# Patient Record
Sex: MALE | Race: WHITE
[De-identification: names, ages, dates, MRNs, and addresses within clinical notes are randomized per-mention and may not be internally consistent; named-entity substitution may affect disease eponyms.]

---

## 2020-03-31 PROBLEM — Z00.00 ENCOUNTER FOR PREVENTIVE HEALTH EXAMINATION: Status: ACTIVE | Noted: 2020-03-31

## 2020-04-09 ENCOUNTER — APPOINTMENT (OUTPATIENT)
Dept: ORTHOPEDIC SURGERY | Facility: CLINIC | Age: 45
End: 2020-04-09

## 2020-07-20 ENCOUNTER — APPOINTMENT (OUTPATIENT)
Dept: ORTHOPEDIC SURGERY | Facility: CLINIC | Age: 45
End: 2020-07-20
Payer: COMMERCIAL

## 2020-07-20 VITALS — BODY MASS INDEX: 28.88 KG/M2 | WEIGHT: 195 LBS | HEIGHT: 69 IN

## 2020-07-20 VITALS — TEMPERATURE: 98.6 F

## 2020-07-20 PROCEDURE — 20610 DRAIN/INJ JOINT/BURSA W/O US: CPT | Mod: LT

## 2020-07-20 PROCEDURE — 99203 OFFICE O/P NEW LOW 30 MIN: CPT | Mod: 25

## 2020-07-20 PROCEDURE — 73030 X-RAY EXAM OF SHOULDER: CPT | Mod: LT

## 2020-07-21 NOTE — PHYSICAL EXAM
[de-identified] : Oriented to time, place, person\par Mood: Normal\par Affect: Normal\par Appearance: Healthy, well appearing, no acute distress.\par Gait: Normal\par Assistive Devices: None\par \par Left shoulder exam\par \par Inspection: No malalignment, No defects, No atrophy\par Skin: No masses, No lesions\par Neck: Negative Spurlings, full ROM, no pain with ROM\par AROM: FF to 170, abduction to 85, ER to 30, IR to mid lumbar\par PROM: Same\par Painful arc ROM: Pain with further passive motion\par Tenderness: Positive bicipital tenderness, no tenderness to the greater tuberosity/RTC insertion, positive anterior shoulder/lesser tuberosity tenderness\par Strength: 5/5 ER, 5/5 IR in adduction, 5/5 supraspinatus testing, mild O'Briens test\par AC Joint: No ttp/pain with cross arm testing\par Biceps: Speed Negative, Yergusons Negative\par Impingement test: Positive Blackwell, Positive Neer \par Stability: Stable\par Vasc: 2+ radial pulse\par Neuro: AIN, PIN, Ulnar nerve in tact to motor\par Sensation: In tact to light touch throughout\par  [de-identified] : \par The following radiographs were ordered and read by me during this patients visit. I reviewed each radiograph in detail with the patient and discussed the findings as highlighted below. \par \par 3 views of the left shoulder were obtained today that show no acute fracture or dislocation. There is moderate glenohumeral and min AC joint degenerative change seen. Type I acromion. There is no significant malalignment. No significant other obvious osseous abnormality, otherwise unremarkable.

## 2020-07-21 NOTE — HISTORY OF PRESENT ILLNESS
[de-identified] : 45 year old RHD male  presents today with left shoulder pain x 2 years. Thanks that it is due to overuse. Recalls being told that he had a RTC strain 20 years ago due to football injury. The pain intermittent brought on with overhead and internal rotation.  He lifts weight 5 days a week ~55lbs and has dropped down in weight over the past 6 months.  He has not received treatment for the shoulder. He was evaluated by another orthopedist 5 months ago, and was told that her has severe OA and recommended joint replacement later on. Takes Advil for pain. Denies numbness or tingling. \par \par The patient's past medical history, past surgical history, medications and allergies were reviewed by me today with the patient and documented accordingly. In addition, the patient's family and social history, which were noncontributory to this visit, were reviewed also.

## 2020-07-21 NOTE — PROCEDURE
[de-identified] : Injection: Left glenohumeral joint.\par Indication: Osteoarthritis.\par \par A discussion was had with the patient regarding this procedure and all questions were answered. All risks, benefits and alternatives were discussed. These included but were not limited to bleeding, infection, and allergic reaction. Alcohol was used to clean the skin, and betadine was used to sterilize and prep the area in the posterior aspect of the left shoulder. Ethyl chloride spray was then used as a topical anesthetic. A 21-gauge needle was used to inject 4cc of 1% lidocaine and 1cc of 40mg/ml methylprednisolone into the left glenohumeral joint. A sterile bandage was then applied. The patient tolerated the procedure well and there were no complications.

## 2020-07-21 NOTE — DISCUSSION/SUMMARY
[de-identified] : 45-year-old male with left shoulder osteoarthritis\par \par I discussed the treatment of degenerative shoulder arthritis with the patient at length today. I described the spectrum of treatment from nonoperative modalities to total joint arthroplasty. Noninvasive and nonoperative treatment modalities include activity modification with avoidance of overhead activity/excessive glenohumeral rotation, PRN use of acetaminophen or anti-inflammatory medication if tolerated, and physical therapy. Additional treatment can include intermittent corticosteroid injection for acute pain relief. Possible use of visco-supplementation also.\par \par Definitive treatment of total joint arthroplasty would be considered for failure of conservative management, progressive pain, and ultimate inability to perform ADL's. Discussed consideration between TSA and revTSA depending on the quality and condition of the rotator cuff tendon. Short-term and long-term outcomes were discussed as well as the potential need for revision arthroplasty. \par \par The risks and benefits of each treatment options was discussed and all questions were answered. \par \par At this time the recommendation is for attempts at conservative management with local injection performed today for symptomatic relief of current pain.  Discussed potential need for arthroplasty at a young age.  May consider Visco supplementation.\par \par Follow up as needed.

## 2021-06-09 ENCOUNTER — APPOINTMENT (OUTPATIENT)
Dept: ORTHOPEDIC SURGERY | Facility: CLINIC | Age: 46
End: 2021-06-09
Payer: COMMERCIAL

## 2021-06-09 PROCEDURE — 20610 DRAIN/INJ JOINT/BURSA W/O US: CPT | Mod: LT

## 2021-06-09 PROCEDURE — 99072 ADDL SUPL MATRL&STAF TM PHE: CPT

## 2021-06-09 PROCEDURE — 99214 OFFICE O/P EST MOD 30 MIN: CPT | Mod: 25

## 2021-06-10 NOTE — HISTORY OF PRESENT ILLNESS
[de-identified] : 46 year old RHD male  presents today with returning of left shoulder pain x 2 months. He received cortisone injection for GH OA at the last visit providing complete relief until ~2 months ago. He is here for repeat injection. Pain is waking patient up from sleep.  The pain intermittent brought on with overhead and internal rotation. Takes Ibuprofen and Marijuana for pain. Denies numbness or tingling.

## 2021-06-10 NOTE — REASON FOR VISIT
[Initial Visit] : an initial visit for [FreeTextEntry2] : Left shoulder pain  [Follow-Up Visit] : a follow-up visit for [Shoulder Pain] : shoulder pain

## 2021-06-10 NOTE — ADDENDUM
[FreeTextEntry1] : This note was written by Shamika Petersen on 06/09/2021 acting solely as a scribe for Dr. Agustin Durand.\par \par All medical record entries made by the Scribe were at my, Dr. Agustin Durand, direction and personally dictated by me on 06/09/2021. I have personally reviewed the chart and agree that the record accurately reflects my personal performance of the history, physical exam, assessment and plan.

## 2021-06-10 NOTE — DISCUSSION/SUMMARY
[de-identified] : 46-year-old male with left shoulder osteoarthritis\par \par Patient presents with recent aggravation of left shoulder osteoarthritis.  Given his age, we continue to discuss management of his current symptoms with intermittent injection therapy for symptomatic relief of current pain.  Patient may also consider alternative treatments including viscosupplementation versus arthroplasty should symptoms progress.  Patient understands degree of injury, and understands continued restriction of overhead activity as able.\par \par Recommendations: Conservative care & observation.  Ice/NSAIDs as needed.\par \par Follow up as needed.

## 2021-06-10 NOTE — PROCEDURE
[de-identified] : Injection: Left glenohumeral joint.\par Indication: Osteoarthritis.\par \par A discussion was had with the patient regarding this procedure and all questions were answered. All risks, benefits and alternatives were discussed. These included but were not limited to bleeding, infection, and allergic reaction. Alcohol was used to clean the skin, and betadine was used to sterilize and prep the area in the posterior aspect of the left shoulder. Ethyl chloride spray was then used as a topical anesthetic. A 21-gauge needle was used to inject 4cc of 1% lidocaine and 1cc of 40mg/ml methylprednisolone into the left glenohumeral joint. A sterile bandage was then applied. The patient tolerated the procedure well and there were no complications.

## 2021-06-10 NOTE — PHYSICAL EXAM
[de-identified] : Oriented to time, place, person\par Mood: Normal\par Affect: Normal\par Appearance: Healthy, well appearing, no acute distress.\par Gait: Normal\par Assistive Devices: None\par \par Left shoulder exam\par \par Inspection: No malalignment, No defects, No atrophy\par Skin: No masses, No lesions\par Neck: Negative Spurlings, full ROM, no pain with ROM\par AROM: FF to 170, abduction to 85, ER to 30, IR to mid lumbar\par PROM: Same\par Painful arc ROM: Pain with further passive motion\par Tenderness: Positive bicipital tenderness, no tenderness to the greater tuberosity/RTC insertion, positive anterior shoulder/lesser tuberosity tenderness\par Strength: 5/5 ER, 5/5 IR in adduction, 5/5 supraspinatus testing, mild O'Briens test\par AC Joint: No ttp/pain with cross arm testing\par Biceps: Speed Negative, Yergusons Negative\par Impingement test: Positive Blackwell, Positive Neer \par Stability: Stable\par Vasc: 2+ radial pulse\par Neuro: AIN, PIN, Ulnar nerve in tact to motor\par Sensation: In tact to light touch throughout\par  [de-identified] : 3 views of the left shoulder were obtained 7/20/2020 that show no acute fracture or dislocation. There is moderate glenohumeral and min AC joint degenerative change seen. Type I acromion. There is no significant malalignment. No significant other obvious osseous abnormality, otherwise unremarkable.

## 2021-10-13 ENCOUNTER — APPOINTMENT (OUTPATIENT)
Dept: ORTHOPEDIC SURGERY | Facility: CLINIC | Age: 46
End: 2021-10-13
Payer: COMMERCIAL

## 2021-10-13 PROCEDURE — 99214 OFFICE O/P EST MOD 30 MIN: CPT

## 2021-10-14 NOTE — PHYSICAL EXAM
[de-identified] : Oriented to time, place, person\par Mood: Normal\par Affect: Normal\par Appearance: Healthy, well appearing, no acute distress.\par Gait: Normal\par Assistive Devices: None\par \par Left shoulder exam\par \par Inspection: No malalignment, No defects, No atrophy\par Skin: No masses, No lesions\par Neck: Negative Spurlings, full ROM, no pain with ROM\par AROM: FF to 170, abduction to 85, ER to 30, IR to mid lumbar\par PROM: Same\par Painful arc ROM: Pain with further passive motion\par Tenderness: Positive bicipital tenderness, no tenderness to the greater tuberosity/RTC insertion, positive anterior shoulder/lesser tuberosity tenderness\par Strength: 5/5 ER, 5/5 IR in adduction, 5/5 supraspinatus testing, mild O'Briens test\par AC Joint: No ttp/pain with cross arm testing\par Biceps: Speed Negative, Yergusons Negative\par Impingement test: Positive Blackwell, Positive Neer \par Stability: Stable\par Vasc: 2+ radial pulse\par Neuro: AIN, PIN, Ulnar nerve in tact to motor\par Sensation: In tact to light touch throughout\par \par Left elbow exam\par \par Skin: Clean, dry, intact. No ecchymosis. No swelling. No palpable joint effusion.\par ROM: Full extension/flexion, full supination/pronation.\par Painful ROM: Lateral elbow pain with resisted wrist extension\par Tenderness: No medial epicondyle pain. Positive lateral epicondyle pain (ECRB). No olecranon pain. No pain at radial head. No pain to radial tunnel.\par Strength: 5/5 elbow flexion, 5/5 elbow extension, 5/5 supination, 5/5 pronation\par Stability: Stable to vaus/valgus stress\par Vasc: 2+ radial pulse, <2s cap refill\par Sensation: In tact to light touch throughout\par Neuro: Negative Tinel at ulnar canal, AIN/PIN/Ulnar nerve in tact to motor/sensation.  [de-identified] : 3 views of the left shoulder were obtained 7/20/2020 that show no acute fracture or dislocation. There is moderate glenohumeral and min AC joint degenerative change seen. Type I acromion. There is no significant malalignment. No significant other obvious osseous abnormality, otherwise unremarkable.

## 2021-10-14 NOTE — ADDENDUM
[FreeTextEntry1] : This note was written by Shamika Petersen on 10/13/2021 acting solely as a scribe for Dr. Agustin Durand.\par \par All medical record entries made by the Scribe were at my, Dr. Agustin Durand, direction and personally dictated by me on 10/13/2021. I have personally reviewed the chart and agree that the record accurately reflects my personal performance of the history, physical exam, assessment and plan.

## 2021-10-14 NOTE — DISCUSSION/SUMMARY
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 Discussed that there are multiple signs of entrapment throughout the course of the ulnar nerve as it traverses behind the elbow. Symptoms include paresthesias of the ulnar aspect of the hand, as well as night symptoms. Patient does not have any significant atrophy or clawing to suggest chronicity of symptoms, and discussed further management and treatment.  Typically, initial treatment is conservative with anti-inflammatories, activity modification, and nighttime elbow extension splinting for mild to symptoms. Can be effective in management, but notoriously symptoms may progress and operative intervention with decompression of the nerve can be performed. Patient may also consider trial of local injection. Discussed conservative management with bracing. Follow-up orthopedic hand if further treatment is required. \par \par We also discussed continued treatment for left shoulder OA. Patient may also consider alternative treatments including viscosupplementation versus arthroplasty should symptoms progress. Patient understands degree of injury, and understands continued restriction of overhead activity as able.\par \par Recommendations: Conservative care & observation as discussed.  Ice/NSAIDs as needed.\par \par Follow up as needed.\par

## 2021-10-14 NOTE — HISTORY OF PRESENT ILLNESS
[de-identified] : 46 year old RHD male  presents today with worsening of left shoulder pain. He received second cortisone injection for GH OA at the last visit which was not helpful this time. Pain is waking patient up from sleep. He is here to discuss other treatment options. The pain intermittent brought on with overhead and internal rotation. Takes Ibuprofen and Marijuana for pain. Denies numbness or tingling. He is also c/o left elbow pain. Localizes pain to the lateral elbow. Reports numbness tingling to the ring and pinky fingers.

## 2023-06-29 ENCOUNTER — APPOINTMENT (OUTPATIENT)
Dept: ORTHOPEDIC SURGERY | Facility: CLINIC | Age: 48
End: 2023-06-29
Payer: COMMERCIAL

## 2023-06-29 DIAGNOSIS — M19.012 PRIMARY OSTEOARTHRITIS, LEFT SHOULDER: ICD-10-CM

## 2023-06-29 PROCEDURE — 73030 X-RAY EXAM OF SHOULDER: CPT | Mod: LT

## 2023-06-29 PROCEDURE — 20610 DRAIN/INJ JOINT/BURSA W/O US: CPT | Mod: LT

## 2023-06-29 PROCEDURE — 99214 OFFICE O/P EST MOD 30 MIN: CPT | Mod: 25

## 2023-07-05 PROBLEM — M19.012 PRIMARY OSTEOARTHRITIS OF LEFT SHOULDER: Status: ACTIVE | Noted: 2020-07-21

## 2023-07-05 NOTE — ADDENDUM
[FreeTextEntry1] : This note was written by Shamika Petersen on 06/29/2023 acting solely as a scribe for Dr. Agustin Durand.\par \par All medical record entries made by the Scribe were at my, Dr. Agustin Durand, direction and personally dictated by me on 06/29/2023. I have personally reviewed the chart and agree that the record accurately reflects my personal performance of the history, physical exam, assessment and plan.

## 2023-07-05 NOTE — DISCUSSION/SUMMARY
[de-identified] : 48-year-old male with left shoulder osteoarthritis\par \par We continued to discuss prolonged conservative treatment for left shoulder OA until symptoms are severe enough that he would consider TSA. He's had good relief with prior injection therapy, so repeat injection was provided for therapeutic and symptomatic relief. Patient understands degree of arthrosis and has considerably restricted overhead activity at this time.\par \par Recommendations: Continue conservative care & observation.  Ice/NSAIDs as needed. Continue activity restriction. PRN use corticosteroids. \par \par Follow up as needed.\par

## 2023-07-05 NOTE — HISTORY OF PRESENT ILLNESS
[de-identified] : 48 year old RHD male  presents today with return of left shoulder pain x 2 months. He received second cortisone injection for GH OA at the last visit  in 2021 which was helpful. Pain is waking patient up from sleep. He is here for steroid injection.  The pain intermittent brought on with  internal rotation. Takes Advil for pain. Denies numbness or tingling.

## 2023-07-05 NOTE — PHYSICAL EXAM
[de-identified] : Oriented to time, place, person\par Mood: Normal\par Affect: Normal\par Appearance: Healthy, well appearing, no acute distress.\par Gait: Normal\par Assistive Devices: None\par \par Left shoulder exam\par \par Inspection: No malalignment, No defects, No atrophy\par Skin: No masses, No lesions\par Neck: Negative Spurlings, full ROM, no pain with ROM\par AROM: FF to 180, abduction to 85, ER to 25, IR to mid lumbar\par PROM: Same\par Painful arc ROM: Pain with further passive motion\par Tenderness: Positive bicipital tenderness, no tenderness to the greater tuberosity/RTC insertion, positive anterior shoulder/lesser tuberosity tenderness\par Strength: 5/5 ER, 5/5 IR in adduction, 5/5 supraspinatus testing, mild O'Briens test\par AC Joint: No ttp/pain with cross arm testing\par Biceps: Speed Negative, Yergusons Negative\par Impingement test: Positive Blackwell, Positive Neer \par Stability: Stable\par Vasc: 2+ radial pulse\par Neuro: AIN, PIN, Ulnar nerve in tact to motor\par Sensation: In tact to light touch throughout [de-identified] : The following radiographs were ordered and read by me during this patients visit. I reviewed each radiograph in detail with the patient and discussed the findings as highlighted below.\par \par 3 views of the left shoulder were obtained 6/29/2023 that show no acute fracture or dislocation. There is severe glenohumeral and min AC joint degenerative change seen. Type I acromion. There is no significant malalignment. No significant other obvious osseous abnormality, otherwise unremarkable.

## 2023-08-01 ENCOUNTER — NON-APPOINTMENT (OUTPATIENT)
Age: 48
End: 2023-08-01

## 2024-12-15 ENCOUNTER — NON-APPOINTMENT (OUTPATIENT)
Age: 49
End: 2024-12-15

## 2025-01-13 NOTE — PROCEDURE
[de-identified] : Injection: Left glenohumeral joint.\par Indication: Osteoarthritis. \par \par A discussion was had with the patient regarding this procedure and all questions were answered. All risks, benefits and alternatives were discussed. These included but were not limited to bleeding, infection, and allergic reaction. Alcohol was used to clean the skin, and betadine was used to sterilize and prep the area in the posterior aspect of the left shoulder. Ethyl chloride spray was then used as a topical anesthetic. A 21-gauge needle was used to inject 4cc of 1% lidocaine and 1cc of 40mg/ml methylprednisolone into the left glenohumeral joint. A sterile bandage was then applied. The patient tolerated the procedure well and there were no complications.  none

## 2025-06-20 ENCOUNTER — NON-APPOINTMENT (OUTPATIENT)
Age: 50
End: 2025-06-20